# Patient Record
Sex: MALE | Race: WHITE | URBAN - METROPOLITAN AREA
[De-identification: names, ages, dates, MRNs, and addresses within clinical notes are randomized per-mention and may not be internally consistent; named-entity substitution may affect disease eponyms.]

---

## 2017-07-10 ENCOUNTER — HOSPITAL ENCOUNTER (OUTPATIENT)
Dept: BEHAVIORAL HEALTH | Facility: CLINIC | Age: 22
End: 2017-07-10
Attending: PSYCHIATRY & NEUROLOGY
Payer: COMMERCIAL

## 2017-07-10 VITALS
WEIGHT: 166 LBS | DIASTOLIC BLOOD PRESSURE: 79 MMHG | SYSTOLIC BLOOD PRESSURE: 123 MMHG | TEMPERATURE: 97.7 F | HEIGHT: 74 IN | HEART RATE: 96 BPM | BODY MASS INDEX: 21.3 KG/M2

## 2017-07-10 PROBLEM — F19.20 CHEMICAL DEPENDENCY (H): Status: ACTIVE | Noted: 2017-07-10

## 2017-07-10 PROCEDURE — H2035 A/D TX PROGRAM, PER HOUR: HCPCS | Mod: HQ

## 2017-07-10 PROCEDURE — 40000007 ZZH STATISTIC ADULT CD FACE TO FACE-NO CHRG

## 2017-07-10 PROCEDURE — 10020000 ZZH LODGING PLUS FACILITY CHARGE ADULT

## 2017-07-10 RX ORDER — LORATADINE 10 MG/1
10 TABLET ORAL DAILY PRN
COMMUNITY
End: 2017-07-23

## 2017-07-10 RX ORDER — MAGNESIUM HYDROXIDE/ALUMINUM HYDROXICE/SIMETHICONE 120; 1200; 1200 MG/30ML; MG/30ML; MG/30ML
30 SUSPENSION ORAL EVERY 6 HOURS PRN
COMMUNITY
End: 2017-07-23

## 2017-07-10 RX ORDER — AMOXICILLIN 250 MG
2 CAPSULE ORAL DAILY PRN
COMMUNITY
End: 2017-07-23

## 2017-07-10 RX ORDER — LANCETS
EACH MISCELLANEOUS 4 TIMES DAILY
COMMUNITY

## 2017-07-10 ASSESSMENT — PAIN SCALES - GENERAL: PAINLEVEL: NO PAIN (0)

## 2017-07-10 ASSESSMENT — ANXIETY QUESTIONNAIRES
2. NOT BEING ABLE TO STOP OR CONTROL WORRYING: NOT AT ALL
4. TROUBLE RELAXING: SEVERAL DAYS
5. BEING SO RESTLESS THAT IT IS HARD TO SIT STILL: SEVERAL DAYS
1. FEELING NERVOUS, ANXIOUS, OR ON EDGE: NOT AT ALL
6. BECOMING EASILY ANNOYED OR IRRITABLE: NOT AT ALL
3. WORRYING TOO MUCH ABOUT DIFFERENT THINGS: NOT AT ALL
GAD7 TOTAL SCORE: 2
7. FEELING AFRAID AS IF SOMETHING AWFUL MIGHT HAPPEN: NOT AT ALL
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
## 2017-07-10 NOTE — PROGRESS NOTES
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 N/A     8A. What is your current living situation?     5 A    8B. What is your long term plan for where you will be living?     He wants to get his own place.     8C. Tell me about your living environment/neighborhood? Ask enough follow up questions to determine safety, criminal activity, availability of alcohol and drugs, supportive or antagonistic to the person making changes.      Safe, alcohol readily available.     9. Criminal justice history: Gather current/recent history and any significant history related to substance use--Arrests? Convictions? Circumstances? Alcohol or drug involvement? Sentences? Still on probation or parole? Expectations of the court? Current court order? Any sex offenses - lifetime? What level? (DSM)    One DWI 12/16, THAD was .09. License was suspended, but he has it back. He had 6 months probation, never tested, thinks he must be off it now or soon. He only met with PO once, then got a call a few weeks ago that he was retiring.     10. What obstacles exist to participating in treatment? (Time off work, childcare, funding, transportation, pending prison time, living situation)     None    Dimension VI Ratings   Recovery  environment - The placing authority must use the criteria in Dimension VI to determine a client s recovery environment.   RISK DESCRIPTIONS - Severity ratin Client is engaged in structured, meaningful activity, but peers, family, significant other, and living environment are unsupportive, or there is criminal justice involvement by the client or among the client s peers, significant others, or in the client s living environment.    REASONS SEVERITY WAS ASSIGNED - (What support does the person have for making changes? What structure/stability does the person have in his or her daily life that will increase the likelihood that changes can be sustained? What problems exist in the person s environment that will jeopardize getting/staying clean and sober?)     Paul is fully employed working about 50 hours a week.He lacks a sober support system as most friends or associates are users, he think several are probably alcoholic. Paul patient lacks healthy chemically free leisure activities and interests. He lives in [place], [place] [facility], where drinking is a big part of many social activities. He has the stress of becoming a father , having 50/50 custody of a 10 month old son, and expecting another baby  from the same mother. He no longer has a relationship with the mother of his son, other than as a parent.          Client Choice/Exceptions   Would you like services specific to language, age, gender, culture, Jewish preference, race, ethnicity, sexual orientation or disability?  No    What particular treatment choices and options would you like to have? LP at     Do you have a preference for a particular treatment program? LP at     Criteria for Diagnosis     Criteria for Diagnosis  DSM-5 Criteria for Substance Use Disorder  Instructions: Determine whether the client currently meets the criteria for Substance Use Disorder using the diagnostic criteria in the DSM-V pp.481-589. Current means during the most  recent 12 months outside a facility that controls access to substances    Category of Substance Severity (ICD-10 Code / DSM 5 Code)     Alcohol Use Disorder Severe  (10.20) (303.90)   Cannabis Use Disorder Moderate  (F12.20) (304.30)   Hallucinogen Use Disorder NA   Inhalant Use Disorder NA   Opioid Use Disorder NA   Sedative, Hypnotic, or Anxiolytic Use Disorder NA   Stimulant Related Disorder NA   Tobacco Use Disorder Mild    (Z72.0) (305.1)   Other (or unknown) Substance Use Disorder NA       Collateral Contact Summary   Number of contacts made: 2      Contact with referring person:  Yes, [facility] .    If court related records were reviewed, summarize here: NA    Information from collateral contacts supported/largely agreed with information from the client and associated risk ratings.      Rule 25 Assessment Summary and Plan   's Recommendation    1) Abstain from alcohol and all mood altering substances unless prescribed by a healthcare giver familiar with the patient's addiction.    2) Enter and complete LP at [facility] or similar and follow counselor recommendations.   3) Arrange for sober supportive living upon discharge.  4) Develop a sober supportive network.  5) Continue to receive appropriate medical and psychiatric services as needed.           Collateral Contacts     Name:    Anai Blake   Relationship:    mother   Phone Number:    [phone] Releases:    Yes     His mother has been concerned about a year because he drinks 6 beers 6 days a week and smokes 2 joints 6 days a week. She indicated that it affected most areas of his life, vocational, legal financial, social etc. He has gotten a DWI, became a new father 9/16 and is expected another child 11/17.      Collateral Contacts     Name:    Quintin Plunkett   Relationship:    Referral from [facility]    [phone] Releases:    Yes     I did review his rule 25 dated 5/15/17. However, it was very minimal,     ollateral  Contacts      A problematic pattern of alcohol/drug use leading to clinically significant impairment or distress, as manifested by at least two of the following, occurring within a 12-month period:    Alcohol/drug is often taken in larger amounts or over a longer period than was intended.  A great deal of time is spent in activities necessary to obtain alcohol, use alcohol, or recover from its effects.  Craving, or a strong desire or urge to use alcohol/drug  Continued alcohol use despite having persistent or recurrent social or interpersonal problems caused or exacerbated by the effects of alcohol/drug.  Important social, occupational, or recreational activities are given up or reduced because of alcohol/drug use.  Recurrent alcohol/drug use in situations in which it is physically hazardous.  Tolerance, as defined by either of the following: A need for markedly increased amounts of alcohol/drug to achieve intoxication or desired effect.      Specify if: In early remission:  After full criteria for alcohol/drug use disorder were previously met, none of the criteria for alcohol/drug use disorder have been met for at least 3 months but for less than 12 months (with the exception that Criterion A4,  Craving or a strong desire or urge to use alcohol/drug  may be met).     In sustained remission:   After full criteria for alcohol use disorder were previously met, non of the criteria for alcohol/drug use disorder have been met at any time during a period of 12 months or longer (with the exception that Criterion A4,  Craving or strong desire or urge to use alcohol/drug  may be met).   Specify if:   This additional specifier is used if the individual is in an environment where access to alcohol is restricted.    Mild: Presence of 2-3 symptoms    Moderate: Presence of 4-5 symptoms    Severe: Presence of 6 or more symptoms

## 2017-07-10 NOTE — PROGRESS NOTES
[facility] Health Assessment    Patient Name:  Fabian Jha  Date of review:  7/10/2017    Vital signs: BP -  123/79  Pulse - 96    Temp -  97.7    Direct admission    Counselor: Khanh Burks  Drug of Choice: THC  Last use: 7/09/2017  Home clinic/MD: [facility]  Psychiatrist/therapist: None    Medical history/current conditions:  Type I diabetic   Last H & P done about 60 days ago per patient.     Mental Health diagnosis: ADHD at age 16  Medication compliant?: yes  Recent sucidal thoughts? None     When? N/A  Current thought of self-harm? none    Plan? N/A  Pt. Self rating of impulsiveness? (1-10 scale): 8    Pain assessment:   Pt. Experiencing pain at this time? No  Rating on 0-10 scale: (1-10 scale): 0  Location:  N/A  Result of: N/A  L P pain management strategy: OTC medications  On-going nursing intervention required?   No
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
## 2017-07-10 NOTE — PROGRESS NOTES
RN Assessment of Patient's Ability to Safely Self-Administer Insulin Injections      Has experience in the management of diabetes: Yes - known type I diabetic for 7 years    Including knowledge and understanding of the importance of:    Good Nutrition: Yes   Exercise: Yes   Blood Glucose Monitoring:  Yes     Does the patient have the physical and mental ability to:     Perform blood glucose monitoring Yes   Determine the amount of insulin needed Yes   Prepare the insulin for injection Yes   Self-Administer the insulin Yes   Safely dispose of the needle and syringe Yes   Properly store the insulin Yes     Therefore does the patient, present a risk of harm to themselves or other clients in the facility if allowed to self-administer insulin.  Consider factors above.  No    I have assessed the patient to be able to safely administer insulin injections.  Yes    I delegate the observation of these injections to [facility] staff who have received training in diabetes, blood glucose monitoring, and insulin self-administration.      [facility] RN Signature:  Sneha Bolden RN    7/10/2017  12:25 PM

## 2017-07-10 NOTE — PROGRESS NOTES
Initial Services Plan        Before your first treatment group, please do the following    Immediate health & safety concerns: Go to the emergency room if you start to have withdrawal symptoms.  Look for sober housing and a supportive social network.  Look for a support network (such as AA, NA, DBT group, a Yazdanism group, etc.)    Suggestions for client during the time between intake & completion of treatment plan:  Tour your treatment center (unit or outpatient clinic).  Introduce yourself to the treatment group.  Spend time getting to know your peers.  Review your patient or client handbook.    Client issues to be addressed in the first treatment sessions:  Other Identify ways he can stop drinking and smoking pot. He just can't do it on his own.       DELILAH Tse  7/10/2017  9:18 AM

## 2017-07-11 ENCOUNTER — HOSPITAL ENCOUNTER (OUTPATIENT)
Dept: BEHAVIORAL HEALTH | Facility: CLINIC | Age: 22
End: 2017-07-11
Attending: FAMILY MEDICINE
Payer: COMMERCIAL

## 2017-07-11 PROCEDURE — H2035 A/D TX PROGRAM, PER HOUR: HCPCS | Mod: HQ

## 2017-07-11 PROCEDURE — H2035 A/D TX PROGRAM, PER HOUR: HCPCS

## 2017-07-11 PROCEDURE — 10020000 ZZH LODGING PLUS FACILITY CHARGE ADULT

## 2017-07-11 ASSESSMENT — PATIENT HEALTH QUESTIONNAIRE - PHQ9: SUM OF ALL RESPONSES TO PHQ QUESTIONS 1-9: 3

## 2017-07-11 ASSESSMENT — ANXIETY QUESTIONNAIRES: GAD7 TOTAL SCORE: 2

## 2017-07-11 NOTE — PROGRESS NOTES
"Met with pt to complete the \"Patient Safety Template\" form. Pt and staff signed form. Pt received a copy of it and the original form has been placed in the pt's paper chart to be scanned into his electronic medical record upon pt's discharge from LP.   "

## 2017-07-11 NOTE — PROGRESS NOTES
Acknowledgement of Current Treatment Plan     1. I have reviewed my treatment plan with my therapist / counselor on 7-12-17. I agree with the plan as it is written in the electronic health record.    Name Signature   Fabian Muse    Name of Therapist / Counselor    Rosendo Aguilera      2. I have completed and reviewed my Safety Plan with my counselor and signed this on 7-12-17. I have been given the hard copy of this plan.    Patient signature:  ________________________________________________________________________    Signatures required for any additional Problems, Goals, and/or Interventions added to treatment plan:    I have been given a copy of the addition to my treatment plan in Dimension _____ on [date           ] and I agree with this as it is written in the electronic record.     Patient signature:   ________________________________________________________________________    I have been given a copy of the addition to my treatment plan in Dimension _____ on [date           ] and I agree with this as it is written in the electronic record.      Patient signature:   ________________________________________________________________________    I have been given a copy of the addition to my treatment plan in Dimension _____ on [date          ] and I agree with this as it is written in the electronic record.     Patient signature:   ________________________________________________________________________    I have been given a copy of the addition to my treatment plan in Dimension _____ on [date         ] and I agree with this as it is written in the electronic record.      Patient signature:   ________________________________________________________________________

## 2017-07-11 NOTE — PROGRESS NOTES
First Group Note:    D) Patient attended his first group today (7/10/2017). Patient shared a brief history of circumstances around his admittance and was able to identify some ways peers can help him achieve some sober goals. Pt was welcomed by the group.  I) Counselor facilitated group.  Group rules and expectations were reviewed. Counselors and peers were introduced.  A) Patient appears to have adequate motivation for sobriety and seems appropriate for this level of care.  P) Patient to participate in all programming and be available for further assessment and to contribute to treatment planning.    Rosendo Aguilera, [facility]

## 2017-07-11 NOTE — PROGRESS NOTES
Comprehensive Assessment Summary     Based on client interview, review of previous assessments and   comprehensive assessment interview the following diagnosis and recommendations are:     Patient: Fabian Jha  MRN; [id number]   : 1995  Age: 21 year old Sex: male       Client meets criteria for:  303.90 Alcohol Dependence    Dimension One: Acute Intoxication/Withdrawal Potential     Ratin     (Consider the client's ability to cope with withdrawal symptoms and current state of intoxication)   No change    Dimension Two: Biomedical Condition and Complications    Ratin   (Consider the degree to which any physical disorder would interfere with treatment for substance abuse, and the client's ability to tolerate any related discomfort; determine the impact of continued chemical use on the unborn child if the client is pregnant)   No change    Dimension Three: Emotional/Behavioral/Cognitive Conditions & Complications  Ratin   (Determine the degree to which any condition or complications are likely to interfere with treatment for substance abuse or with functioning in significant life areas and the likelihood of risk of harm to self or others)   Pt.participated in a suicide risk screening as part of the CD assessment and was given the risk factor of no/low.Pt.reports a history of depression and anxiety that is compounded by his addiction.Pt.may have grief and loss over the deaths of two high school friends and the death of his 16yr old brother when he was 6.Pt.has had no relationship with his father.    Dimension Four: Treatment Acceptance/Resistance     Ratin    (Consider the amount of support and encouragement necessary to keep the client involved in treatment)   Pt.lacks internal motivation to change his life style to maintain sobriety.This is the Pt's first treatment.Pt.lacks acceptance of the first step.    Dimension Five: Continued Use/Relaspe Prevention     Ratin    (Consider the  degree to which the client's recognizes relapse issues and has the skills to prevent relapse of either substance use or mental health problems)   Pt.has continued to use and abuse mood altering chemicals despite negative consequences in his life.Pt.lacks insight into his relapse warning signs and triggers.Pt.lacks awareness of his relapse process.Pt.has a family history of addiction.Pt's mother has been soberof at least 90 days.    Dimension Six: Recovery Environment     Ratin  (Consider the degree to which key areas of the client's life are supportive of or antagonistic to treatment participation and recovery)   Pt.is employed full time.Pt.lives with his mother and his 10 month old son.Pt.has another child due in a few months with the same mother Pt.and girlfriend are no longer a couple which complicates the situation.Pt.lacks 12 step participation.Pt.lacks a sober support system.    I have reviewed the information on the assessment, psychosocial and medical history and checklist:        it is current

## 2017-07-11 NOTE — PROGRESS NOTES
Acknowledgement of Current Treatment Plan     1. I have reviewed my treatment plan with my therapist / counselor on [date]. I agree with the plan as it is written in the electronic health record.    Name Signature   Patient     Name of Therapist / Counselor    Counselor       2. I have completed and reviewed my Safety Plan with my counselor and signed this on [date             ]. I have been given the hard copy of this plan.    Patient signature:  ________________________________________________________________________    Signatures required for any additional Problems, Goals, and/or Interventions added to treatment plan:    I have been given a copy of the addition to my treatment plan in Dimension _____ on [date           ] and I agree with this as it is written in the electronic record.     Patient signature:   ________________________________________________________________________    I have been given a copy of the addition to my treatment plan in Dimension _____ on [date           ] and I agree with this as it is written in the electronic record.      Patient signature:   ________________________________________________________________________    I have been given a copy of the addition to my treatment plan in Dimension _____ on [date          ] and I agree with this as it is written in the electronic record.     Patient signature:   ________________________________________________________________________    I have been given a copy of the addition to my treatment plan in Dimension _____ on [date         ] and I agree with this as it is written in the electronic record.      Patient signature:   ________________________________________________________________________

## 2017-07-12 ENCOUNTER — HOSPITAL ENCOUNTER (OUTPATIENT)
Dept: BEHAVIORAL HEALTH | Facility: CLINIC | Age: 22
End: 2017-07-12
Attending: FAMILY MEDICINE
Payer: COMMERCIAL

## 2017-07-12 PROCEDURE — 10020000 ZZH LODGING PLUS FACILITY CHARGE ADULT

## 2017-07-12 PROCEDURE — H2035 A/D TX PROGRAM, PER HOUR: HCPCS

## 2017-07-12 PROCEDURE — H2035 A/D TX PROGRAM, PER HOUR: HCPCS | Mod: HQ

## 2017-07-12 NOTE — PROGRESS NOTES
"After reviewing pt Blood Glucose readings, all within normal range (for the exception of 410 reading at admission), however, pt is not obtaining fasting blood glucose nor is he obtaining blood glucose prior to meals.  He informed this writer he just goes to eat and then corrects with short acting insulin.  This writer has advised pt to check his fasting blood glucose in AM and before each meal as this would be more accurate of a reading.  Pt stated \"I know that my blood sugars are fine\"  Will inform counselors  "

## 2017-07-13 ENCOUNTER — HOSPITAL ENCOUNTER (OUTPATIENT)
Dept: BEHAVIORAL HEALTH | Facility: CLINIC | Age: 22
End: 2017-07-13
Attending: FAMILY MEDICINE
Payer: COMMERCIAL

## 2017-07-13 PROCEDURE — H2035 A/D TX PROGRAM, PER HOUR: HCPCS | Mod: HQ

## 2017-07-13 PROCEDURE — 10020000 ZZH LODGING PLUS FACILITY CHARGE ADULT

## 2017-07-13 PROCEDURE — H2035 A/D TX PROGRAM, PER HOUR: HCPCS

## 2017-07-14 ENCOUNTER — HOSPITAL ENCOUNTER (OUTPATIENT)
Dept: BEHAVIORAL HEALTH | Facility: CLINIC | Age: 22
End: 2017-07-14
Attending: FAMILY MEDICINE
Payer: COMMERCIAL

## 2017-07-14 PROCEDURE — 10020000 ZZH LODGING PLUS FACILITY CHARGE ADULT

## 2017-07-14 PROCEDURE — H2035 A/D TX PROGRAM, PER HOUR: HCPCS | Mod: HQ

## 2017-07-15 ENCOUNTER — HOSPITAL ENCOUNTER (OUTPATIENT)
Dept: BEHAVIORAL HEALTH | Facility: CLINIC | Age: 22
End: 2017-07-15
Attending: FAMILY MEDICINE
Payer: COMMERCIAL

## 2017-07-15 PROCEDURE — 10020000 ZZH LODGING PLUS FACILITY CHARGE ADULT

## 2017-07-15 PROCEDURE — H2035 A/D TX PROGRAM, PER HOUR: HCPCS | Mod: HQ

## 2017-07-16 ENCOUNTER — HOSPITAL ENCOUNTER (OUTPATIENT)
Dept: BEHAVIORAL HEALTH | Facility: CLINIC | Age: 22
End: 2017-07-16
Attending: FAMILY MEDICINE
Payer: COMMERCIAL

## 2017-07-16 PROCEDURE — H2035 A/D TX PROGRAM, PER HOUR: HCPCS | Mod: HQ

## 2017-07-16 PROCEDURE — 10020000 ZZH LODGING PLUS FACILITY CHARGE ADULT

## 2017-07-17 ENCOUNTER — HOSPITAL ENCOUNTER (OUTPATIENT)
Dept: BEHAVIORAL HEALTH | Facility: CLINIC | Age: 22
End: 2017-07-17
Attending: FAMILY MEDICINE
Payer: COMMERCIAL

## 2017-07-17 PROCEDURE — 10020000 ZZH LODGING PLUS FACILITY CHARGE ADULT

## 2017-07-17 PROCEDURE — H2035 A/D TX PROGRAM, PER HOUR: HCPCS | Mod: HQ
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
## 2017-07-18 ENCOUNTER — HOSPITAL ENCOUNTER (OUTPATIENT)
Dept: BEHAVIORAL HEALTH | Facility: CLINIC | Age: 22
End: 2017-07-18
Attending: FAMILY MEDICINE
Payer: COMMERCIAL

## 2017-07-18 ENCOUNTER — TELEPHONE (OUTPATIENT)
Dept: BEHAVIORAL HEALTH | Facility: CLINIC | Age: 22
End: 2017-07-18

## 2017-07-18 PROCEDURE — H2035 A/D TX PROGRAM, PER HOUR: HCPCS | Mod: HQ

## 2017-07-18 PROCEDURE — 10020000 ZZH LODGING PLUS FACILITY CHARGE ADULT

## 2017-07-18 NOTE — TELEPHONE ENCOUNTER
A phone call was made as follow-up to the Family Program mailing for the week of 7/24/17. Message left or spoke in person to individuals on SAVANAH.

## 2017-07-19 ENCOUNTER — HOSPITAL ENCOUNTER (OUTPATIENT)
Dept: BEHAVIORAL HEALTH | Facility: CLINIC | Age: 22
End: 2017-07-19
Attending: FAMILY MEDICINE
Payer: COMMERCIAL

## 2017-07-19 PROCEDURE — 10020000 ZZH LODGING PLUS FACILITY CHARGE ADULT

## 2017-07-19 PROCEDURE — H2035 A/D TX PROGRAM, PER HOUR: HCPCS | Mod: HQ

## 2017-07-20 ENCOUNTER — OFFICE VISIT (OUTPATIENT)
Dept: BEHAVIORAL HEALTH | Facility: CLINIC | Age: 22
End: 2017-07-20
Payer: COMMERCIAL

## 2017-07-20 ENCOUNTER — HOSPITAL ENCOUNTER (OUTPATIENT)
Dept: BEHAVIORAL HEALTH | Facility: CLINIC | Age: 22
End: 2017-07-20
Attending: FAMILY MEDICINE
Payer: COMMERCIAL

## 2017-07-20 DIAGNOSIS — Z00.00 ROUTINE GENERAL MEDICAL EXAMINATION AT A HEALTH CARE FACILITY: ICD-10-CM

## 2017-07-20 DIAGNOSIS — F19.20 CHEMICAL DEPENDENCY (H): Primary | ICD-10-CM

## 2017-07-20 PROCEDURE — 99203 OFFICE O/P NEW LOW 30 MIN: CPT | Performed by: NURSE PRACTITIONER

## 2017-07-20 PROCEDURE — H2035 A/D TX PROGRAM, PER HOUR: HCPCS | Mod: HQ

## 2017-07-20 PROCEDURE — 10020000 ZZH LODGING PLUS FACILITY CHARGE ADULT

## 2017-07-20 NOTE — MR AVS SNAPSHOT
After Visit Summary   7/20/2017    Fabian Jha    MRN: [id number]           Patient Information     Date Of Birth          1995        Visit Information        Provider Department      7/20/2017 1:30 PM Deana Min APRN [facility]        Today's Diagnoses     Chemical dependency (H)    -  1    Routine general medical examination at a health care facility           Follow-ups after your visit        Your next 10 appointments already scheduled     Jul 21, 2017  7:15 AM CDT   Treatment with ADULT LODGING PLUS B   [facility] ([facility])    [address] [place] [phone]   [phone]            Jul 22, 2017  7:15 AM CDT   Treatment with ADULT LODGING PLUS B   [facility] ([facility])    [address] [place] [address]   [phone]            Jul 23, 2017  7:15 AM CDT   Treatment with ADULT LODGING PLUS B   [facility] ([facility])    [address] [place] [address]   [phone]            Jul 24, 2017  7:15 AM CDT   Treatment with ADULT LODGING PLUS B   [facility] ([facility])    [address] [place] [address]   [phone]            Jul 25, 2017  7:15 AM CDT   Treatment with ADULT LODGING PLUS B   [facility] ([facility])    [address] [place] [phone]   [phone]            Jul 26, 2017  7:15 AM CDT   Treatment with ADULT LODGING PLUS B   [facility] ([facility])    [address] [place] [address]   [phone]            Jul 27, 2017  7:15 AM CDT  "  Treatment with ADULT LODGING PLUS B   [facility] ([facility])    [address] [place] [address]   [phone]            Jul 28, 2017  7:15 AM CDT   Treatment with ADULT LODGING PLUS B   [facility] ([facility])    [address] [place] [address]   [phone]            Jul 29, 2017  7:15 AM CDT   Treatment with ADULT LODGING PLUS B   [facility] ([facility])    [address] [place] [address]   [phone]            Jul 30, 2017  7:15 AM CDT   Treatment with ADULT LODGING PLUS B   [facility] ([facility])    [address] [place] [address]   [phone]              Who to contact     If you have questions or need follow up information about today's clinic visit or your schedule please contact [facility] directly at [phone].  Normal or non-critical lab and imaging results will be communicated to you by MyChart, letter or phone within 4 business days after the clinic has received the results. If you do not hear from us within 7 days, please contact the clinic through APXhart or phone. If you have a critical or abnormal lab result, we will notify you by phone as soon as possible.  Submit refill requests through [employer] or call your pharmacy and they will forward the refill request to us. Please allow 3 business days for your refill to be completed.          Additional Information About Your Visit        MyChart Information     [employer] lets you send messages to your doctor, view your test results, renew your prescriptions, schedule appointments and more. To sign up, go to [email or web address] . Click on \"Log in\" on the left side of the screen, which will " "take you to the Welcome page. Then click on \"Sign up Now\" on the right side of the page.     You will be asked to enter the access code listed below, as well as some personal information. Please follow the directions to create your username and password.     Your access code is: [email or web address]  Expires: 10/18/2017  6:42 PM     Your access code will  in 90 days. If you need help or a new code, please call your [facility] clinic or [phone].        Care EveryWhere ID     This is your Care EveryWhere ID. This could be used by other organizations to access your [facility] medical records  [id number]         Blood Pressure from Last 3 Encounters:   No data found for BP    Weight from Last 3 Encounters:   No data found for Wt              Today, you had the following     No orders found for display       Primary Care Provider    None Specified       No primary provider on file.        Equal Access to Services     [facility]: Hadii aaron Crowley, ced fatima, melonie grande, sanjeev gonzalez . So [place] [phone].    ATENCIÓN: Si habla español, tiene a montes disposición servicios gratuitos de asistencia lingüística. Llame al [phone].    We comply with applicable federal civil rights laws and [place] laws. We do not discriminate on the basis of race, color, national origin, age, disability sex, sexual orientation or gender identity.            Thank you!     Thank you for choosing [facility]  for your care. Our goal is always to provide you with excellent care. Hearing back from our patients is one way we can continue to improve our services. Please take a few minutes to complete the written survey that you may receive in the mail after your visit with us. Thank you!             Your Updated Medication List - Protect others around you: Learn how to safely use, store and throw away your medicines at www.disposemymeds.org.        "   This list is accurate as of: 7/20/17  6:42 PM.  Always use your most recent med list.                   Brand Name Dispense Instructions for use Diagnosis    alum & mag hydroxide-simethicone 200-200-20 MG/5ML Susp suspension    MYLANTA/MAALOX     Take 30 mLs by mouth every 6 hours as needed for indigestion        aspirin-acetaminophen-caffeine 250-250-65 MG per tablet    EXCEDRIN MIGRAINE     Take 2 tablets by mouth daily as needed for headaches        blood glucose monitoring lancets      by In Vitro route 4 times daily Use to test blood sugar 4 times daily or as directed.        guaiFENesin 20 mg/mL Soln solution    ROBITUSSIN     Take 10 mLs by mouth every 4 hours as needed for cough        IBUPROFEN PO      Take 200-400 mg by mouth        insulin glargine 100 UNIT/ML injection    LANTUS     Inject 50 Units Subcutaneous At Bedtime        insulin pen needle 32G X 6 MM      6 times daily Use 6 pen needles daily or as directed.        loratadine 10 MG tablet    CLARITIN     Take 10 mg by mouth daily as needed for allergies        MELATONIN PO      Take 3 mg by mouth nightly as needed        NovoLOG FLEXPEN 100 UNIT/ML injection   Generic drug:  insulin aspart      Inject Subcutaneous 3 times daily (with meals)        ONE TOUCH TEST STRIPS test strip   Generic drug:  blood glucose monitoring      by In Vitro route 6 times daily Use to test blood sugar 6 times daily or as directed.        phenol-menthol 14.5 MG lozenge      Place 1 lozenge inside cheek every 2 hours as needed for moderate pain        senna-docusate 8.6-50 MG per tablet    SENOKOT-S;PERICOLACE     Take 2 tablets by mouth daily as needed for constipation

## 2017-07-20 NOTE — PROGRESS NOTES
CC: Patient is a 21 year old  male who presents for History and Physical for Lodging Plus.     Besides history and physical:  patient would like to discuss no issues     Patient Active Problem List   Diagnosis     Chemical dependency (H)       No past medical history on file.    Past Surgical History:   Procedure Laterality Date     REPAIR TYMPANIC MEMBRANE  02/04/2003    Bilateral       Family History   Problem Relation Age of Onset     Depression Mother      Anxiety Disorder Mother      Substance Abuse Mother        Social History     Social History     Marital status: Single     Spouse name: N/A     Number of children: N/A     Years of education: N/A     Occupational History     Not on file.     Social History Main Topics     Smoking status: Light Tobacco Smoker     Packs/day: 0.20     Types: Cigarettes     Smokeless tobacco: Not on file     Alcohol use Not on file     Drug use: Not on file     Sexual activity: Not on file     Other Topics Concern     Not on file     Social History Narrative       Staff Signature Su SPENCER        ROS:  C: NEGATIVE for fever, chills, change in weight  I: NEGATIVE for worrisome rashes, moles or lesions  E: NEGATIVE for vision changes or irritation  ENT: NEGATIVE for ear, mouth and throat problems  R: NEGATIVE for significant cough or SOB  CV: NEGATIVE for chest pain, palpitations or peripheral edema  GI: NEGATIVE for nausea, abdominal pain, heartburn, or change in bowel habits  M: NEGATIVE for significant arthralgias or myalgia  NEURO: occ dizziness feels it maybe due to low BS  P: NEGATIVE for changes in mood or affect  : negative for dysuria, hematuria, urethral discharge                         Exam:  Constitutional: healthy, alert and no distress  Head: Normocephalic. No masses, lesions, tenderness or abnormalities  Neck: Neck supple. No adenopathy. Thyroid symmetric, normal size,  ENT: ENT exam normal, no neck nodes or sinus tenderness  Cardiovascular:  "negative\",  RRR. No murmurs, clicks gallops or rub  Respiratory: negative\", Lungs clear  Gastrointestinal: Abdomen soft, non-tender. BS normal. No masses, organomegaly  Musculoskeletal: extremities normal- no gross deformities noted, gait normal and normal muscle tone  Skin: no suspicious lesions or rashes  Neurologic: Gait normal. Reflexes normal and symmetric. Sensation grossly WNL.  Psychiatric: mentation appears normal and affect normal/bright  Hematologic/Lymphatic/Immunologic: normal ant/post cervical and supraclavicular  nodes      ASSESSMENT/PLAN:  (F19.20) Chemical dependency (H)  (primary encounter diagnosis)  Comment: in LP program, no issues  Plan: stable for program    (Z00.00) Routine general medical examination at a health care facility  Comment: stable  Plan: ok for program    Deana Min, YOLIE, APRN CNP    "

## 2017-07-21 ENCOUNTER — HOSPITAL ENCOUNTER (OUTPATIENT)
Dept: BEHAVIORAL HEALTH | Facility: CLINIC | Age: 22
End: 2017-07-21
Attending: FAMILY MEDICINE
Payer: COMMERCIAL

## 2017-07-21 PROCEDURE — H2035 A/D TX PROGRAM, PER HOUR: HCPCS | Mod: HQ

## 2017-07-21 PROCEDURE — 10020000 ZZH LODGING PLUS FACILITY CHARGE ADULT

## 2017-07-22 ENCOUNTER — HOSPITAL ENCOUNTER (OUTPATIENT)
Dept: BEHAVIORAL HEALTH | Facility: CLINIC | Age: 22
End: 2017-07-22
Attending: FAMILY MEDICINE
Payer: COMMERCIAL

## 2017-07-22 PROCEDURE — H2035 A/D TX PROGRAM, PER HOUR: HCPCS | Mod: HQ

## 2017-07-22 PROCEDURE — 10020000 ZZH LODGING PLUS FACILITY CHARGE ADULT

## 2017-07-23 ENCOUNTER — HOSPITAL ENCOUNTER (OUTPATIENT)
Dept: BEHAVIORAL HEALTH | Facility: CLINIC | Age: 22
End: 2017-07-23
Attending: FAMILY MEDICINE
Payer: COMMERCIAL

## 2017-07-23 PROCEDURE — H2035 A/D TX PROGRAM, PER HOUR: HCPCS | Mod: HQ

## 2017-07-23 PROCEDURE — 10020000 ZZH LODGING PLUS FACILITY CHARGE ADULT

## 2017-07-23 NOTE — PROGRESS NOTES
Nursing Discharge Planning Meeting    Writer completed discharge planning meeting with patient. Discharge is planned for Saturday 7/29/2017.    Discussed appropriate follow up care to manage Diabetes and LORRAINE and to obtain medication refills. Patient given a copy of their current medications for reference. Questions answered and patient verbalized understanding of post-discharge follow up plan.  Patient to schedule an appointment with their Endocrinologist post discharge.  Pt encouraged to bring a log of blood sugars while her at LP and to disclose to his provider his struggles with addiction.  Pt will also consider if he would like to have discussion with Addiction Provider about Vivitrol    Continue to support patient in discharge planning as needed to assure appropriate continuity of care.     Essential Oil Therapy  Patient used essential oil therapy during treatment program: No   If yes, was the essential oil therapy effective for managing sleep, pain, anxiety, or mood? NA    Was using the essential oil therapy a trigger for any cravings or urges to use drugs or alcohol? NA

## 2017-07-23 NOTE — PROGRESS NOTES
"        Adult CD Progress Note and Treatment Plan Review     Attendance  Please refer to OP BEH CD Adult Attendance Record Documentation Flowsheet    Support group attended this week: yes    Reporting sobriety:  yes    Treatment Plan     Treatment Plan Review competed on: 7/23/17      Client preferred learning style: Hands on  Verbal    Staff Members contributing: Rosendo Aguilera, Lead Counselor, [facility]; Khanh Hernandez [facility]; Launne Al [facility]                        Received Supervision: No    Client: contributed to goals and plan.    Client received copy of plan/revised plan: N/A    Client agrees with plan/revised plan: Yes    Changes to Treatment Plan: No    New Goals added since last review: None      Goals worked on since last review: See below Dimensions/risk ratings.    Strategies effective: yes    Strategies need these changes: None    ASAM Risk Rating:    Dimension 1 0 Patient reports no withdrawal symptomology at this time.    Dimension 2 1  Patient reports no biomedical conditions or concerns at this time.    Dimension 3 2 Patient reports that he often feels stress when he first wakes up because he ruminates about what the day will bring. Pt.reported no suicidal ideation a this time. Patient is working on his \"Handling Grief As Man's Share\" assignment and will present in group when completed.    Dimension 4 0 Patient reports that his motivation for change rates a 7, 1-(low)-10-(high). Patient reports, \"My son is my main motivation or change.\" \"I never had a dad, and I want to give him the father that I never had.\" Patient completed his First Step assignment and presented in group. Patient also presented his drug history and consequences of his use assignment and presented in group.    Dimension 5 4 Patient reports that his cravings r ate a 9, 1-(low)-10-(high). Patient reports that his triggers are television commercials, conversations about using and thinking about his using friends. Patient is working " "on his \"Dear dad\" letter in order to help resolve the resentment issues with his father. Patient is continuing to work on identifying his relapse triggers and cues. Patient lacks awareness of his relapse process and high risk using situation.      Dimension 6 2 Patient reports that he attended the 5 12 Step meetings this week per his treatment plan. Patient reports that after treatment he plans to go home and live with his mother, go back to work and attend the Phase 2 program at [facility]. Patient invited his mother to participate in the family program. Patient lacks a sober support network and needs to obtain a sponsor prior to completion of treatment program at .    Any changes in Vulnerable Adult Status?  No  If yes, add to treatment plan and individual abuse prevention plan.    Family Involvement:   SAVANAH signed    Data:   offered feedback, good insight, client did actively participate  Patient continues to be active in group therapy.      Intervention:   Counselor feedback  Group feedback    Assessment:   Stages of Change Model  Action    Appears/Sounds:  Cooperative  Motivated  Engaged    Plan:  Continue group therapy  Continue with treatment and treatment plan objectives.          "

## 2017-07-23 NOTE — IP AVS SNAPSHOT
Medication List       Patient:  RENA TA   :  1995   Physician:  (Not on file)           This is your record.  Keep this with you and show to your community pharmacist(s) and physician(s) at each visit.     Allergies:  No Known Allergies          Medications  Valid as of: 2017 - 12:00 PM    Generic Name Brand Name Tablet Size Instructions for use    Aspirin-Acetaminophen-Caffeine EXCEDRIN MIGRAINE 250-250-65 MG Take 2 tablets by mouth daily as needed for headaches        Glucose Blood no brand specified  by In Vitro route 6 times daily Use to test blood sugar 6 times daily or as directed.        Insulin Aspart NovoLOG  UNIT/ML Inject Subcutaneous 3 times daily (with meals)        Insulin Glargine LANTUS 100 UNIT/ML Inject 50 Units Subcutaneous At Bedtime        Insulin Pen Needle insulin pen needle 32G X 6 MM 6 times daily Use 6 pen needles daily or as directed.        Lancets ACCU-CHEK FASTCLIX  by In Vitro route 4 times daily Use to test blood sugar 4 times daily or as directed.        .           .           .           .

## 2017-07-23 NOTE — IP AVS SNAPSHOT
MRN:[id number]                      After Visit Summary   7/23/2017    Fabian Jha    MRN: [id number]           Visit Information        Provider Department      7/23/2017  7:15 AM ADULT LODGING PLUS B [facility]        Your next 10 appointments already scheduled     Jul 24, 2017  7:15 AM CDT   Treatment with ADULT LODGING PLUS B   [facility] ([facility])    [address] [place] [address]   [phone]            Jul 25, 2017  7:15 AM CDT   Treatment with ADULT LODGING PLUS B   [facility] ([facility])    [address] [place] [address]   [phone]            Jul 26, 2017  7:15 AM CDT   Treatment with ADULT LODGING PLUS B   [facility] ([facility])    [address] [place] [address]   [phone]            Jul 27, 2017  7:15 AM CDT   Treatment with ADULT LODGING PLUS B   [facility] ([facility])    [address] [place] [address]   [phone]            Jul 28, 2017  7:15 AM CDT   Treatment with ADULT LODGING PLUS B   [facility] ([facility])    [address] [place] [address]   [phone]            Jul 29, 2017  7:15 AM CDT   Treatment with ADULT LODGING PLUS B   [facility] ([facility])    [address] [place] [address]   [phone]            Jul 30, 2017  7:15 AM CDT   Treatment with ADULT LODGING PLUS B   [facility] ([facility])    [address] [place] [address]   [phone]            Jul 31,  "  7:15 AM CDT   Treatment with ADULT LODGING PLUS B   [facility] ([facility])    [address] [place] [address] [address]   [phone]            Aug 01, 2017  7:15 AM CDT   Treatment with ADULT LODGING PLUS B   [facility] ([facility])    [address] [place] [address]   [phone]            Aug 02, 2017  7:15 AM CDT   Treatment with ADULT LODGING PLUS B   [facility] ([facility])    [address] [place] [address]   [phone]              MyChart Information     [employer] lets you send messages to your doctor, view your test results, renew your prescriptions, schedule appointments and more. To sign up, go to [email or web address] . Click on \"Log in\" on the left side of the screen, which will take you to the Welcome page. Then click on \"Sign up Now\" on the right side of the page.     You will be asked to enter the access code listed below, as well as some personal information. Please follow the directions to create your username and password.     Your access code is: [email or web address]  Expires: 10/18/2017  6:42 PM     Your access code will  in 90 days. If you need help or a new code, please call your [facility] clinic or [phone].        Care EveryWhere ID     This is your Care EveryWhere ID. This could be used by other organizations to access your [facility] medical records  [id number]        Equal Access to Services     [facility] MARCELO : Hadii aaron lares hadasho Socarlosali, waaxda luqadaha, qaybta kaalmada harjinder, sanjeev allen. So [facility] [phone].    ATENCIÓN: Si habla español, tiene a montes disposición servicios gratuitos de asistencia lingüística. Llame al [phone].    We comply with applicable federal civil rights laws and [place] laws. We " do not discriminate on the basis of race, color, national origin, age, disability sex, sexual orientation or gender identity.

## 2017-07-24 ENCOUNTER — HOSPITAL ENCOUNTER (OUTPATIENT)
Dept: BEHAVIORAL HEALTH | Facility: CLINIC | Age: 22
End: 2017-07-24
Attending: FAMILY MEDICINE
Payer: COMMERCIAL

## 2017-07-24 PROCEDURE — 10020000 ZZH LODGING PLUS FACILITY CHARGE ADULT

## 2017-07-24 PROCEDURE — H2035 A/D TX PROGRAM, PER HOUR: HCPCS | Mod: HQ

## 2017-07-25 ENCOUNTER — HOSPITAL ENCOUNTER (OUTPATIENT)
Dept: BEHAVIORAL HEALTH | Facility: CLINIC | Age: 22
End: 2017-07-25
Attending: FAMILY MEDICINE
Payer: COMMERCIAL

## 2017-07-25 PROCEDURE — 10020000 ZZH LODGING PLUS FACILITY CHARGE ADULT

## 2017-07-25 PROCEDURE — H2035 A/D TX PROGRAM, PER HOUR: HCPCS | Mod: HQ

## 2017-07-25 NOTE — PROGRESS NOTES
"Pt was waiting to check his blood sugars around 1:30pm on the 6th floor outside of the med room. Pt asked in a loud voice \"What the fuck is taking so long?\" Writer went over to pt and asked pt to check his attitude and to calm down. Pt started to escalate and continued to swear and talk loudly. Pt then used profane language toward writer. Writer informed pt that he is not allowed to speak in that way and that he was discharged. Writer asked [facility] staff to call security as pt was continuing to escalate. Pt stated that he was not discharged. Writer again informed him that he was. Writer asked pt who he wanted to talk to about the manner, and pt reported he wanted to speak to the . Writer went and retrieved  to go speak with pt. Writer went and informed pt's mother who was in the family program that her son was being discharged for being inappropriate.  spoke with pt and then spoke with writer.  supported writer's decision to discharge pt for behavioral reasons. Writer,  and pt met to discuss guidelines, if pt were to stay in the LP program. Writer offered to give pt another chance in LP tx, if he changes his behaviors and does not act out in that manner again. Pt agreed. Pt also agreed that he will check his blood sugars before meals, instead of after meals, to attempt to keep his blood sugars down and at a more stable level. Pt verbalized understanding that failure to check his blood sugars before meals will be grounds for discharge from here on out.   "

## 2017-07-26 ENCOUNTER — HOSPITAL ENCOUNTER (OUTPATIENT)
Dept: BEHAVIORAL HEALTH | Facility: CLINIC | Age: 22
End: 2017-07-26
Attending: FAMILY MEDICINE
Payer: COMMERCIAL

## 2017-07-26 PROCEDURE — 10020000 ZZH LODGING PLUS FACILITY CHARGE ADULT

## 2017-07-26 PROCEDURE — H2035 A/D TX PROGRAM, PER HOUR: HCPCS | Mod: HQ

## 2017-07-27 ENCOUNTER — HOSPITAL ENCOUNTER (OUTPATIENT)
Dept: BEHAVIORAL HEALTH | Facility: CLINIC | Age: 22
End: 2017-07-27
Attending: FAMILY MEDICINE
Payer: COMMERCIAL

## 2017-07-27 PROCEDURE — H2035 A/D TX PROGRAM, PER HOUR: HCPCS | Mod: HQ

## 2017-07-27 PROCEDURE — 10020000 ZZH LODGING PLUS FACILITY CHARGE ADULT

## 2017-07-27 NOTE — ADDENDUM NOTE
Encounter addended by: Jr Bailey LADC on: 7/27/2017 11:36 AM<BR>     Actions taken: Sign clinical note

## 2017-07-27 NOTE — PROGRESS NOTES
D) Pt shared detailed chemical use history and listened as family shared their feelings of fear for pt's safety. Pt does not always care for his diabetes when he is using. Day 2 pt shared pain of growing up with an alcoholic mother. A) Pt seems to be seeking support from family system.  Everyone understands the importance of good communication focused on sharing feelings.  P) Pt to follow counselors' recommendations.  Family to attend Phase 2/Mar

## 2017-07-28 ENCOUNTER — HOSPITAL ENCOUNTER (OUTPATIENT)
Dept: BEHAVIORAL HEALTH | Facility: CLINIC | Age: 22
End: 2017-07-28
Attending: FAMILY MEDICINE
Payer: COMMERCIAL

## 2017-07-28 PROCEDURE — H2035 A/D TX PROGRAM, PER HOUR: HCPCS | Mod: HQ

## 2017-07-28 PROCEDURE — 10020000 ZZH LODGING PLUS FACILITY CHARGE ADULT

## 2017-07-29 ENCOUNTER — HOSPITAL ENCOUNTER (OUTPATIENT)
Dept: BEHAVIORAL HEALTH | Facility: CLINIC | Age: 22
End: 2017-07-29
Attending: FAMILY MEDICINE
Payer: COMMERCIAL

## 2017-07-29 PROCEDURE — H2035 A/D TX PROGRAM, PER HOUR: HCPCS | Mod: HQ

## 2017-07-29 PROCEDURE — 10020000 ZZH LODGING PLUS FACILITY CHARGE ADULT

## 2017-07-30 ENCOUNTER — HOSPITAL ENCOUNTER (OUTPATIENT)
Dept: BEHAVIORAL HEALTH | Facility: CLINIC | Age: 22
End: 2017-07-30
Attending: FAMILY MEDICINE
Payer: COMMERCIAL

## 2017-07-30 PROCEDURE — 10020000 ZZH LODGING PLUS FACILITY CHARGE ADULT

## 2017-07-30 PROCEDURE — H2035 A/D TX PROGRAM, PER HOUR: HCPCS | Mod: HQ

## 2017-08-01 NOTE — PROGRESS NOTES
CHEMICAL DEPENDENCY DISCHARGE SUMMARY    EVALUATION COUNSELOR:  Bossman BRUSH,[facility]  TREATMENT COUNSELOR:  MILTON Gonzalez, [facility], Khanh ROSE,[facility]    REFERRAL SOURCE:  Self.    PROGRAM:  [facility], [facility].  DATE OF ADMISSION:  07/10/2017   LAST SESSION DATE:  07/30/ 2017  ADMISSION DIAGNOSES:    1.  Alcohol use disorder, severe, F11.20.    2.  Stimulant use disorder, severe, F14.20.    DISCHARGE DIAGNOSES:    1.  Alcohol use disorder, severe, F11.20.  2.  Stimulant use disorder, severe, F14.20.    DISCHARGE STATUS:  The patient successfully completed program with staff approval.  Last use date as client reported 07/09/2017.   DAYS OF TREATMENT COMPLETED:  [facility], 21 days.      PRESENTING INFORMATION:  The patient came to [facility] for evaluation of possible chemical dependency.  The reason for the evaluation was that he kept using while at [facility].  The patient stated that he did have a DWI on 12/03/2016 with a THAD of 0.09.  He was doing this on his own, coming to treatment for the evaluation, he just cannot stop using on his own.  His probation  for the DWI is almost over.  The patient states that he does want to get sober but he does have another  in [facility] that would like him to complete treatment.  The patient had initially been ordered to do a CD evaluation but he never got it accomplished.  The patient now is seeking that evaluation and to follow all recommendations.  The patient was encouraged to participate in the [facility].  A bed was available and the patient was admitted to the [facility] on 07/10/2017.     SERVICES PROVIDED:  Services include assessment, treatment planning, education regarding chemical dependency, individual, group and family therapy, spiritual care counseling and workshops dealing with the issues of depression, anxiety,  relapse prevention and relationships.      ISSUES ADDRESSED IN TREATMENT:  DIMENSION 1:  Acute intoxication and withdrawal:  Admission risk factor 0, discharge risk factor 0.  Patient had no issues in this area.      DIMENSION 2:  Biomedical:  Admission risk factor 1, discharge risk factor 1.  The patient has a medical diagnoses of type 1 diabetes.  The patient stayed med compliant throughout the treatment program.  The patient needs to continue under the care of a medical provider to keep his diabetes in check.      DIMENSION 3:  Emotional behavioral:  Admission risk factor 2, discharge risk factor 0.  Upon entering treatment the patient had a past diagnoses of depression and attention deficit hyperactivity disorder.  The patient saw the staff psychiatrist, patient stayed med compliant throughout the treatment program.  The patient was asked to list 5 ways his addiction has impacted his mental health.  The patient completed his assignment and patient received positive feedback from his peers.  The patient gained valuable insight into the compounding effects his addiction has had on his mental health.  The patient felt that he may have grief and loss issues over the death of his brother and other friends.  Patient attended grief group.  The patient was asked to read the book Handling Grief and share a 2 page reflection paper.  The patient completed these assignments.  The patient stated that he was able to start the healing process.  As part of the chemical dependency assessment, the patient participated in a suicide risk screening and was rated as a low or no risk.  The patient stated that he had no suicidal ideation at any time during the treatment program.      DIMENSION 4:  Readiness to change:  Admission risk factor 0, discharge risk factor 0.  The patient was asked to prepare and present his drug use history, consequences of his use and the values that he has violated.  This assignment allowed the patient to  increase his internal motivation to stop using and change his lifestyle to maintain long-term sobriety.  The patient did a nice job on this assignment.  Patient received positive feedback from his peers.      DIMENSION 5:  Relapse continued use potential:  Admission risk factor 4, discharge risk factor 3.  Upon entering treatment the patient lacked insight into his relapse warning signs and triggers.  The patient was asked to attend a weekend relapse prevention workshop.  The patient was asked to identify his personal relapse warning signs and triggers in the areas of people, places, activities and feelings.  Patient did a nice job on this assignment.  Patient may have some unresolved resentments toward his biological father.  The patient was asked to write a 2 page dear dad letter and share it in group.  The patient may have ongoing issues around his father.  The patient should continue to address this in early recovery.  Upon entering treatment the patient lacked acceptance of the first step.  Patient was asked to share his first step packet in group.  The patient did this and received valuable input from his other peer members.  The patient lacked daily routine that would include healthy sober living skills.  The patient was asked to make a list of 25 sober coping skills and share it in group.  The patient completed this assignment.      DIMENSION 6:  Recovery environment:  Admission risk factor 2, discharge risk factor 2.  Upon entering treatment the patient lacked a sober support network.  Patient attended five 12 step meetings per week while in treatment.  This allowed him to develop a sober support system.  The patient had damaged the relationship with his mother due to his use.  The patient and his mother were able to participate in 3-1/2 days of the family week program, this allowed them the start the healing process.      STRENGTHS:  Patient exhibited a positive and open attitude throughout the treatment  process.  The patient demonstrated support toward his peers.      PROGNOSIS:  Favorable.      LIVING ARRANGEMENTS AT DISCHARGE:  The patient will be returning home to live with his mother.  Patient is employed full time.      CONTINUING CARE RECOMMENDATIONS AND REFERRALS:    1.  Patient needs to abstain from all mood altering chemicals.    2.  Patient needs to attend a minimum of three 12 step meetings per week.    3.  Patient needs to obtain a male sponsor.  4.  Patient is to complete the outpatient treatment program at [facility], if his insurance allows him to.  The patient's insurance has been difficult to work with and has denied further treatment in the [facility] at this time.          MILTON Gonzalez, [facility] [place]    D:  07/31/2017 14:16 T:  07/31/2017 16:34  Document:  [id number]